# Patient Record
Sex: FEMALE | Race: WHITE
[De-identification: names, ages, dates, MRNs, and addresses within clinical notes are randomized per-mention and may not be internally consistent; named-entity substitution may affect disease eponyms.]

---

## 2019-06-16 ENCOUNTER — HOSPITAL ENCOUNTER (EMERGENCY)
Dept: HOSPITAL 41 - JD.ED | Age: 69
Discharge: HOME | End: 2019-06-16
Payer: MEDICARE

## 2019-06-16 DIAGNOSIS — Z88.1: ICD-10-CM

## 2019-06-16 DIAGNOSIS — S80.811A: ICD-10-CM

## 2019-06-16 DIAGNOSIS — M19.90: ICD-10-CM

## 2019-06-16 DIAGNOSIS — S52.124A: Primary | ICD-10-CM
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
## 2019-06-16 DIAGNOSIS — S50.311A: ICD-10-CM

## 2019-06-16 DIAGNOSIS — S60.211A: ICD-10-CM

## 2019-06-16 NOTE — EDM.PDOC
ED HPI GENERAL MEDICAL PROBLEM





- General


Chief Complaint: Upper Extremity Injury/Pain


Stated Complaint: RT WRIST INJURY


Time Seen by Provider: 06/16/19 18:12


Source of Information: Reports: Patient, Family


History Limitations: Reports: No Limitations





- History of Present Illness


INITIAL COMMENTS - FREE TEXT/NARRATIVE: 





68-year-old female presents the ED after tripping and falling outside the shop 

about 2 hours ago. She states she went to close the door and her flip-flop got 

stuck in a crack causing her to lose her balance and she fell directly onto the 

concrete. She missed 2 stairs on the way down. Landed hard on her right elbow 

and wrist. Pain is getting worse over the last 48 hours and especially in the 

wrist. She denies hitting her head or hurting her neck. She has a minimal 

superficial abrasion to her right lateral proximal leg. Tetanus toxoid is up-to-

date.


Onset: Today


Onset Date: 06/16/19


Onset Time: 16:15


Duration: Hour(s):


Location: Reports: Upper Extremity, Right (Injury to the right elbow and 

forearm and wrist.)


Quality: Reports: Ache, Throbbing


Severity: Moderate (6 out of 10)


Improves with: Reports: Rest


Worsens with: Reports: Movement


Context: Reports: Trauma (Lost her balance and fell with direct blow to the 

right elbow and wrist.).  Denies: Activity, Exercise, Sick Contact


Associated Symptoms: Reports: No Other Symptoms


Treatments PTA: Reports: Other (see below) (None.)


  ** Right Arm


Pain Score (Numeric/FACES): 8





- Related Data


 Allergies











Allergy/AdvReac Type Severity Reaction Status Date / Time


 


amoxicillin Allergy  Rash Verified 06/16/19 18:06











Home Meds: 


 Home Meds





atorvaSTATin [Lipitor] 20 mg PO BEDTIME 06/16/19 [History]


oxyCODONE HCl/Acetaminophen [Percocet 5-325 mg Tablet] 1 - 2 each PO Q4H PRN #

12 tablet 06/16/19 [Rx]











Past Medical History


Cardiovascular History: Reports: High Cholesterol


Musculoskeletal History: Reports: Osteoarthritis





Social & Family History





- Tobacco Use


Smoking Status *Q: Never Smoker





- Recreational Drug Use


Recreational Drug Use: No





- Living Situation & Occupation


Living situation: Reports: 


Occupation: Employed





Review of Systems





- Review of Systems


Review Of Systems: See Below


Constitutional: Reports: No Symptoms.  Denies: Chills, Diaphoresis, Fever, 

Weakness


Eyes: Reports: Glasses


Ears: Reports: No Symptoms


Nose: Reports: No Symptoms


Mouth/Throat: Reports: No Symptoms


Respiratory: Reports: No Symptoms


Cardiovascular: Reports: No Symptoms


GI/Abdominal: Reports: No Symptoms


Genitourinary: Reports: No Symptoms


Musculoskeletal: Reports: Joint Pain (Occasionally knees hips low back)


Skin: Reports: No Symptoms


Neurological: Reports: No Symptoms


Psychiatric: Reports: No Symptoms





ED EXAM, GENERAL





- Physical Exam


Exam: See Below


Exam Limited By: No Limitations


General Appearance: Alert, WD/WN, Mild Distress


Head: Atraumatic, Normocephalic


Neck: Normal Inspection, Supple, Non-Tender, Full Range of Motion.  No: 

Lymphadenopathy (L), Lymphadenopathy (R)


Respiratory/Chest: No Respiratory Distress, Lungs Clear, Normal Breath Sounds, 

No Accessory Muscle Use, Chest Non-Tender, Other


Cardiovascular: Normal Peripheral Pulses (Denies any pain on palpation of her 

ribs or sternum.), Regular Rate, Rhythm, No Edema, No Gallop, No Murmur, No Rub


Extremities: Other (Emanation of the left upper extremity shows no injuries. 

The right lower extremity shows a superficial abrasion to the lateral proximal 

leg which will not require any treatment other than topical antibiotic therapy. 

The right forearm shows swelling particularly over the ulnar aspect of the 

wrist. She has difficulty making a fist. There are contusions and some swelling 

of the extensor surface of the forearm. There are 3 fairly large abrasions over 

the right elbow. She has difficulty pronating supinating at the elbow due to 

pain in the wrist. None of the wounds are deep enough to require sutures. They 

are abrasions measuring)


Neurological: Alert ( 1-2 cm in diameter), Oriented, CN II-XII Intact, Normal 

Cognition


Psychiatric: Other (A mild degree of pain.)





Course





- Vital Signs


Last Recorded V/S: 


 Last Vital Signs











Temp  36.6 C   06/16/19 18:03


 


Pulse  71   06/16/19 18:03


 


Resp  16   06/16/19 18:03


 


BP      


 


Pulse Ox  100   06/16/19 18:03














- Orders/Labs/Meds


Orders: 


 Active Orders 24 hr











 Category Date Time Status


 


 Forearm 2V Rt [CR] Stat Exams  06/16/19 18:13 Taken


 


 Wrist Comp Min 3V Rt [CR] Stat Exams  06/16/19 18:12 Taken














- Radiology Interpretation


Free Text/Narrative:: 


68-year-old female presents to the ED with an acute injury to her right elbow 

right forearm and right wrist from a fall. This occurred about 2 hours ago 

outside of a shop. She states her flip-flop got stuck in a crack can concrete 

surfaces causing her to fall directly on her right elbow and wrist. Any other 

injuries. States the pain in her wrist is getting worse over the last 2 hours 

and this what made her come to the hospital. Examination shows swelling over 

the dorsal aspect of the wrist particularly on the ulnar aspect. There are some 

superficial abrasions to the extensor surface of the forearm and larger 

abrasions to the elbow particularly over the olecranon process. Difficulty 

pronating supinating at the elbow. The humerus appears to be intact as does the 

shoulder. Plan x-ray of the right forearm and wrist 3 view to be done.








- Re-Assessments/Exams


Free Text/Narrative Re-Assessment/Exam: 





06/16/19 19:00: X-rays of the forearm reveals a very subtle  undisplaced 

fracture through the neck of the radial head. There is very slight elevation of 

the anterior fat pad. The distal radius and ulna and wrist or carpal bones are 

normal without fracture. No will be a sling for the next 10 days. Her wounds on 

her elbows was are superficial abrasions will be cleansed and topical 

antibiotic be placed on them daily until the scab over. Will prescribe Percocet 

tabs 5/325 mg 12 tablets for pain relief 1 tablet with ibuprofen 600 mg every 

6 hours should suffice for pain management. Advised follow-up with her primary 

care provider or Dr. Saab in 10 days' time




















Departure





- Departure


Time of Disposition: 19:12


Disposition: Home, Self-Care 01


Condition: Fair


Clinical Impression: 


 Abrasion of elbow without infection





Fracture of radial head, right, closed


Qualifiers:


 Encounter type: initial encounter Fracture alignment: nondisplaced Qualified 

Code(s): S52.124A - Nondisplaced fracture of head of right radius, initial 

encounter for closed fracture





Contusion of wrist, right


Qualifiers:


 Encounter type: initial encounter Qualified Code(s): S60.211A - Contusion of 

right wrist, initial encounter








- Discharge Information


*PRESCRIPTION DRUG MONITORING PROGRAM REVIEWED*: Not Applicable


*COPY OF PRESCRIPTION DRUG MONITORING REPORT IN PATIENT SHAYE: Not Applicable


Prescriptions: 


oxyCODONE HCl/Acetaminophen [Percocet 5-325 mg Tablet] 1 - 2 each PO Q4H PRN #

12 tablet


 PRN Reason: pain relief. 


Instructions:  Radial Head Fracture, Easy-to-Read


Referrals: 


PCP,Not In Area [Primary Care Provider] - 


Forms:  ED Department Discharge


Additional Instructions: 


Evaluation the emergency room today in regards to injuries to the right elbow 

and wrist that occurred from a fall. There is marked swelling over the dorsal 

aspect of the right wrist which is soft tissue in origin as x-rays of the 

carpal bones or wrist bones and the distal radius and ulna are negative for 

fracture. Our x-rays do show a nondisplaced hairline crack through the radial 

head at the elbow. Treatment for this is simple sling for the next 10 days and 

then after this to start to leave it out of the sling and do regain normal 

range of motion as tolerated. Abrasions to the right elbow should be cleansed 

daily with soap and water. Showering is okay. Then apply topical antibiotic 

such as bacitracin or Polysporin to the wounds once or twice daily until they 

are healed to prevent secondary wound infection. Suggest follow-up with 

personal care physician or Dr. Saba  orthopedic surgeon in 10 days' time for 

review of radial head fracture. Dr. Saab's office number is [phone]. 

Expect to the elbow and wrist for one half hour out of every 4 hours today and 

tomorrow to reduce swelling. Management to be Motrin 600 mg with one tablet of 

Percocet 5/325 mg every 6 hours as needed for pain relief. Usually pain is much 

improved within the next 48-72 hours.





- My Orders


Last 24 Hours: 


My Active Orders





06/16/19 18:12


Wrist Comp Min 3V Rt [CR] Stat 





06/16/19 18:13


Forearm 2V Rt [CR] Stat 














- Assessment/Plan


Last 24 Hours: 


My Active Orders





06/16/19 18:12


Wrist Comp Min 3V Rt [CR] Stat 





06/16/19 18:13


Forearm 2V Rt [CR] Stat

## 2019-06-17 NOTE — CR
Right wrist:  Four views of the right wrist were obtained.

 

Comparison: No prior wrist exam.

 

No fracture, dislocation or other bony abnormality is seen.

 

Impression:

1.  Nothing acute is appreciated on right wrist exam.

 

Diagnostic code #2

## 2019-06-17 NOTE — CR
Right forearm: Two views of the right forearm were obtained.

 

Comparison: No previous forearm study.

 

No fracture or other bony abnormality is seen.

 

Impression:

1.  Unremarkable two-view right forearm study.

 

Diagnostic code #1